# Patient Record
Sex: MALE | Race: WHITE | NOT HISPANIC OR LATINO | URBAN - NONMETROPOLITAN AREA
[De-identification: names, ages, dates, MRNs, and addresses within clinical notes are randomized per-mention and may not be internally consistent; named-entity substitution may affect disease eponyms.]

---

## 2021-08-25 ENCOUNTER — LAB (OUTPATIENT)
Dept: LAB | Facility: HOSPITAL | Age: 46
End: 2021-08-25

## 2021-08-25 ENCOUNTER — TRANSCRIBE ORDERS (OUTPATIENT)
Dept: ADMINISTRATIVE | Facility: HOSPITAL | Age: 46
End: 2021-08-25

## 2021-08-25 DIAGNOSIS — Z11.59 SPECIAL SCREENING EXAMINATION FOR UNSPECIFIED VIRAL DISEASE: ICD-10-CM

## 2021-08-25 DIAGNOSIS — Z11.59 SPECIAL SCREENING EXAMINATION FOR UNSPECIFIED VIRAL DISEASE: Primary | ICD-10-CM

## 2021-08-26 LAB — SARS-COV-2 RNA NOSE QL NAA+PROBE: NOT DETECTED

## 2021-08-26 PROCEDURE — U0004 COV-19 TEST NON-CDC HGH THRU: HCPCS | Performed by: INTERNAL MEDICINE

## 2025-05-04 ENCOUNTER — APPOINTMENT (OUTPATIENT)
Dept: GENERAL RADIOLOGY | Facility: HOSPITAL | Age: 50
End: 2025-05-04
Payer: COMMERCIAL

## 2025-05-04 ENCOUNTER — HOSPITAL ENCOUNTER (EMERGENCY)
Facility: HOSPITAL | Age: 50
Discharge: HOME | End: 2025-05-04
Attending: STUDENT IN AN ORGANIZED HEALTH CARE EDUCATION/TRAINING PROGRAM | Admitting: STUDENT IN AN ORGANIZED HEALTH CARE EDUCATION/TRAINING PROGRAM
Payer: COMMERCIAL

## 2025-05-04 ENCOUNTER — APPOINTMENT (OUTPATIENT)
Dept: CT IMAGING | Facility: HOSPITAL | Age: 50
End: 2025-05-04
Payer: COMMERCIAL

## 2025-05-04 VITALS
SYSTOLIC BLOOD PRESSURE: 151 MMHG | WEIGHT: 220 LBS | RESPIRATION RATE: 16 BRPM | HEIGHT: 70 IN | BODY MASS INDEX: 31.5 KG/M2 | TEMPERATURE: 97.5 F | DIASTOLIC BLOOD PRESSURE: 92 MMHG | HEART RATE: 80 BPM | OXYGEN SATURATION: 93 %

## 2025-05-04 DIAGNOSIS — R06.02 SHORTNESS OF BREATH: Primary | ICD-10-CM

## 2025-05-04 DIAGNOSIS — R07.9 CHEST PAIN, UNSPECIFIED TYPE: ICD-10-CM

## 2025-05-04 DIAGNOSIS — R60.0 PERIPHERAL EDEMA: ICD-10-CM

## 2025-05-04 LAB
ALBUMIN SERPL-MCNC: 4.2 G/DL (ref 3.5–5.2)
ALBUMIN/GLOB SERPL: 1.6 G/DL
ALP SERPL-CCNC: 66 U/L (ref 39–117)
ALT SERPL W P-5'-P-CCNC: 25 U/L (ref 1–41)
ANION GAP SERPL CALCULATED.3IONS-SCNC: 7.9 MMOL/L (ref 5–15)
APTT PPP: 29.1 SECONDS (ref 24.5–35.9)
AST SERPL-CCNC: 21 U/L (ref 1–40)
B PARAPERT DNA SPEC QL NAA+PROBE: NOT DETECTED
B PERT DNA SPEC QL NAA+PROBE: NOT DETECTED
BASOPHILS # BLD AUTO: 0.03 10*3/MM3 (ref 0–0.2)
BASOPHILS NFR BLD AUTO: 0.5 % (ref 0–1.5)
BILIRUB SERPL-MCNC: 0.5 MG/DL (ref 0–1.2)
BILIRUB UR QL STRIP: NEGATIVE
BUN SERPL-MCNC: 18 MG/DL (ref 6–20)
BUN/CREAT SERPL: 19.6 (ref 7–25)
C PNEUM DNA NPH QL NAA+NON-PROBE: NOT DETECTED
CALCIUM SPEC-SCNC: 9.3 MG/DL (ref 8.6–10.5)
CHLORIDE SERPL-SCNC: 107 MMOL/L (ref 98–107)
CLARITY UR: CLEAR
CO2 SERPL-SCNC: 30.1 MMOL/L (ref 22–29)
COLOR UR: YELLOW
CREAT SERPL-MCNC: 0.92 MG/DL (ref 0.76–1.27)
CRP SERPL-MCNC: <0.3 MG/DL (ref 0–0.5)
D DIMER PPP FEU-MCNC: 0.42 MCGFEU/ML (ref 0–0.51)
D-LACTATE SERPL-SCNC: 0.7 MMOL/L (ref 0.5–2)
DEPRECATED RDW RBC AUTO: 45.1 FL (ref 37–54)
EGFRCR SERPLBLD CKD-EPI 2021: 100.7 ML/MIN/1.73
EOSINOPHIL # BLD AUTO: 0.16 10*3/MM3 (ref 0–0.4)
EOSINOPHIL NFR BLD AUTO: 2.6 % (ref 0.3–6.2)
ERYTHROCYTE [DISTWIDTH] IN BLOOD BY AUTOMATED COUNT: 13.6 % (ref 12.3–15.4)
FLUAV SUBTYP SPEC NAA+PROBE: NOT DETECTED
FLUBV RNA ISLT QL NAA+PROBE: NOT DETECTED
GEN 5 1HR TROPONIN T REFLEX: 18 NG/L
GLOBULIN UR ELPH-MCNC: 2.6 GM/DL
GLUCOSE BLDC GLUCOMTR-MCNC: 126 MG/DL (ref 70–130)
GLUCOSE SERPL-MCNC: 156 MG/DL (ref 65–99)
GLUCOSE UR STRIP-MCNC: NEGATIVE MG/DL
HADV DNA SPEC NAA+PROBE: NOT DETECTED
HCOV 229E RNA SPEC QL NAA+PROBE: NOT DETECTED
HCOV HKU1 RNA SPEC QL NAA+PROBE: NOT DETECTED
HCOV NL63 RNA SPEC QL NAA+PROBE: NOT DETECTED
HCOV OC43 RNA SPEC QL NAA+PROBE: NOT DETECTED
HCT VFR BLD AUTO: 41.5 % (ref 37.5–51)
HGB BLD-MCNC: 13.8 G/DL (ref 13–17.7)
HGB UR QL STRIP.AUTO: NEGATIVE
HMPV RNA NPH QL NAA+NON-PROBE: NOT DETECTED
HOLD SPECIMEN: NORMAL
HOLD SPECIMEN: NORMAL
HPIV1 RNA ISLT QL NAA+PROBE: NOT DETECTED
HPIV2 RNA SPEC QL NAA+PROBE: NOT DETECTED
HPIV3 RNA NPH QL NAA+PROBE: NOT DETECTED
HPIV4 P GENE NPH QL NAA+PROBE: NOT DETECTED
IMM GRANULOCYTES # BLD AUTO: 0.04 10*3/MM3 (ref 0–0.05)
IMM GRANULOCYTES NFR BLD AUTO: 0.7 % (ref 0–0.5)
INR PPP: 0.96 (ref 0.9–1.1)
KETONES UR QL STRIP: NEGATIVE
LEUKOCYTE ESTERASE UR QL STRIP.AUTO: NEGATIVE
LIPASE SERPL-CCNC: 57 U/L (ref 13–60)
LYMPHOCYTES # BLD AUTO: 1.35 10*3/MM3 (ref 0.7–3.1)
LYMPHOCYTES NFR BLD AUTO: 22.2 % (ref 19.6–45.3)
M PNEUMO IGG SER IA-ACNC: NOT DETECTED
MAGNESIUM SERPL-MCNC: 1.8 MG/DL (ref 1.6–2.6)
MCH RBC QN AUTO: 29.7 PG (ref 26.6–33)
MCHC RBC AUTO-ENTMCNC: 33.3 G/DL (ref 31.5–35.7)
MCV RBC AUTO: 89.2 FL (ref 79–97)
MONOCYTES # BLD AUTO: 0.69 10*3/MM3 (ref 0.1–0.9)
MONOCYTES NFR BLD AUTO: 11.3 % (ref 5–12)
NEUTROPHILS NFR BLD AUTO: 3.82 10*3/MM3 (ref 1.7–7)
NEUTROPHILS NFR BLD AUTO: 62.7 % (ref 42.7–76)
NITRITE UR QL STRIP: NEGATIVE
NRBC BLD AUTO-RTO: 0 /100 WBC (ref 0–0.2)
NT-PROBNP SERPL-MCNC: 248.5 PG/ML (ref 0–900)
PH UR STRIP.AUTO: 6.5 [PH] (ref 5–8)
PLATELET # BLD AUTO: 208 10*3/MM3 (ref 140–450)
PMV BLD AUTO: 8.7 FL (ref 6–12)
POTASSIUM SERPL-SCNC: 4.1 MMOL/L (ref 3.5–5.2)
PROT SERPL-MCNC: 6.8 G/DL (ref 6–8.5)
PROT UR QL STRIP: NEGATIVE
PROTHROMBIN TIME: 12.9 SECONDS (ref 11.6–15.1)
QT INTERVAL: 368 MS
QTC INTERVAL: 434 MS
RBC # BLD AUTO: 4.65 10*6/MM3 (ref 4.14–5.8)
RHINOVIRUS RNA SPEC NAA+PROBE: NOT DETECTED
RSV RNA NPH QL NAA+NON-PROBE: NOT DETECTED
SARS-COV-2 RNA RESP QL NAA+PROBE: NOT DETECTED
SODIUM SERPL-SCNC: 145 MMOL/L (ref 136–145)
SP GR UR STRIP: 1.01 (ref 1–1.03)
T4 FREE SERPL-MCNC: 1.13 NG/DL (ref 0.92–1.68)
TROPONIN T NUMERIC DELTA: -1 NG/L
TROPONIN T SERPL HS-MCNC: 19 NG/L
TSH SERPL DL<=0.05 MIU/L-ACNC: 4.22 UIU/ML (ref 0.27–4.2)
UROBILINOGEN UR QL STRIP: NORMAL
WBC NRBC COR # BLD AUTO: 6.09 10*3/MM3 (ref 3.4–10.8)
WHOLE BLOOD HOLD COAG: NORMAL
WHOLE BLOOD HOLD SPECIMEN: NORMAL

## 2025-05-04 PROCEDURE — 71045 X-RAY EXAM CHEST 1 VIEW: CPT | Performed by: RADIOLOGY

## 2025-05-04 PROCEDURE — 85730 THROMBOPLASTIN TIME PARTIAL: CPT | Performed by: PHYSICIAN ASSISTANT

## 2025-05-04 PROCEDURE — 84439 ASSAY OF FREE THYROXINE: CPT | Performed by: PHYSICIAN ASSISTANT

## 2025-05-04 PROCEDURE — 70450 CT HEAD/BRAIN W/O DYE: CPT | Performed by: RADIOLOGY

## 2025-05-04 PROCEDURE — 71275 CT ANGIOGRAPHY CHEST: CPT

## 2025-05-04 PROCEDURE — 71275 CT ANGIOGRAPHY CHEST: CPT | Performed by: RADIOLOGY

## 2025-05-04 PROCEDURE — 36415 COLL VENOUS BLD VENIPUNCTURE: CPT

## 2025-05-04 PROCEDURE — 84484 ASSAY OF TROPONIN QUANT: CPT | Performed by: PHYSICIAN ASSISTANT

## 2025-05-04 PROCEDURE — 85379 FIBRIN DEGRADATION QUANT: CPT | Performed by: PHYSICIAN ASSISTANT

## 2025-05-04 PROCEDURE — 82948 REAGENT STRIP/BLOOD GLUCOSE: CPT

## 2025-05-04 PROCEDURE — 83690 ASSAY OF LIPASE: CPT | Performed by: PHYSICIAN ASSISTANT

## 2025-05-04 PROCEDURE — 72125 CT NECK SPINE W/O DYE: CPT | Performed by: RADIOLOGY

## 2025-05-04 PROCEDURE — 83880 ASSAY OF NATRIURETIC PEPTIDE: CPT | Performed by: PHYSICIAN ASSISTANT

## 2025-05-04 PROCEDURE — 81003 URINALYSIS AUTO W/O SCOPE: CPT | Performed by: PHYSICIAN ASSISTANT

## 2025-05-04 PROCEDURE — 85610 PROTHROMBIN TIME: CPT | Performed by: PHYSICIAN ASSISTANT

## 2025-05-04 PROCEDURE — 83605 ASSAY OF LACTIC ACID: CPT | Performed by: PHYSICIAN ASSISTANT

## 2025-05-04 PROCEDURE — 70450 CT HEAD/BRAIN W/O DYE: CPT

## 2025-05-04 PROCEDURE — 93010 ELECTROCARDIOGRAM REPORT: CPT | Performed by: INTERNAL MEDICINE

## 2025-05-04 PROCEDURE — 99285 EMERGENCY DEPT VISIT HI MDM: CPT

## 2025-05-04 PROCEDURE — 71045 X-RAY EXAM CHEST 1 VIEW: CPT

## 2025-05-04 PROCEDURE — 80050 GENERAL HEALTH PANEL: CPT | Performed by: PHYSICIAN ASSISTANT

## 2025-05-04 PROCEDURE — 86140 C-REACTIVE PROTEIN: CPT | Performed by: PHYSICIAN ASSISTANT

## 2025-05-04 PROCEDURE — 87040 BLOOD CULTURE FOR BACTERIA: CPT | Performed by: PHYSICIAN ASSISTANT

## 2025-05-04 PROCEDURE — 25510000001 IOPAMIDOL PER 1 ML: Performed by: STUDENT IN AN ORGANIZED HEALTH CARE EDUCATION/TRAINING PROGRAM

## 2025-05-04 PROCEDURE — 93005 ELECTROCARDIOGRAM TRACING: CPT | Performed by: PHYSICIAN ASSISTANT

## 2025-05-04 PROCEDURE — 72125 CT NECK SPINE W/O DYE: CPT

## 2025-05-04 PROCEDURE — 0202U NFCT DS 22 TRGT SARS-COV-2: CPT | Performed by: PHYSICIAN ASSISTANT

## 2025-05-04 PROCEDURE — 83735 ASSAY OF MAGNESIUM: CPT | Performed by: PHYSICIAN ASSISTANT

## 2025-05-04 RX ORDER — IOPAMIDOL 755 MG/ML
100 INJECTION, SOLUTION INTRAVASCULAR
Status: DISCONTINUED | OUTPATIENT
Start: 2025-05-04 | End: 2025-05-04

## 2025-05-04 RX ORDER — IOPAMIDOL 755 MG/ML
100 INJECTION, SOLUTION INTRAVASCULAR
Status: COMPLETED | OUTPATIENT
Start: 2025-05-04 | End: 2025-05-04

## 2025-05-04 RX ORDER — SODIUM CHLORIDE 0.9 % (FLUSH) 0.9 %
10 SYRINGE (ML) INJECTION AS NEEDED
Status: DISCONTINUED | OUTPATIENT
Start: 2025-05-04 | End: 2025-05-04 | Stop reason: HOSPADM

## 2025-05-04 RX ADMIN — IOPAMIDOL 60 ML: 755 INJECTION, SOLUTION INTRAVENOUS at 13:57

## 2025-05-04 NOTE — Clinical Note
[facility] EMERGENCY DEPARTMENT  [address] [facility] [address]  Phone: [phone]    Shavon Conrad accompanied Da Conrad to the emergency department on 5/4/2025. They may return to work on 05/05/2025.        Thank you for choosing [facility].    Gin Benites, PA

## 2025-05-04 NOTE — ED PROVIDER NOTES
Subjective   History of Present Illness  49-year-old male presents to the ED today for shortness of breath.  He states he woke up this morning and felt fine and then about an hour after he woke up he became short of breath with chest tightness.  He states this was probably around 9 AM.  He states he then developed a cough and coughed up pink, foamy sputum.  He denies any fever.  He denies any specific known sick contacts but he does work in a hospital setting.  He states he has had bilateral lower extremity edema for a long time but it has been worse this week and is described as pitting.  He states he has had left lung pain and it is worse when he eats.  States he is also having head and neck pain and his ears feel full, almost like they need to pop.  Has any history of heart disease or CHF.  He was diagnosed with sarcoidosis in 2018.  He has not currently on any medication for it.  He states he does take medication for hypertension and depression.  He is supposed to be on Mounjaro for his diabetes but has not had it in about a month.  He is on losartan and Cialis.    History provided by:  Patient  Shortness of Breath  Severity:  Moderate  Onset quality:  Sudden  Duration:  2 hours  Timing:  Constant  Progression:  Unchanged  Chronicity:  New  Relieved by:  Nothing  Worsened by:  Nothing  Associated symptoms: chest pain, cough, ear pain, headaches, hemoptysis (pink, foamy sputum), neck pain and sputum production    Associated symptoms: no abdominal pain, no diaphoresis, no fever, no rash, no sore throat, no syncope, no swollen glands, no vomiting and no wheezing    Risk factors: obesity        Review of Systems   Constitutional:  Negative for diaphoresis and fever.   HENT:  Positive for ear pain. Negative for sore throat.    Eyes: Negative.    Respiratory:  Positive for cough, hemoptysis (pink, foamy sputum), sputum production, chest tightness and shortness of breath. Negative for wheezing.    Cardiovascular:   Positive for chest pain, palpitations and leg swelling. Negative for syncope.   Gastrointestinal:  Negative for abdominal pain and vomiting.   Genitourinary: Negative.    Musculoskeletal:  Positive for neck pain.   Skin:  Negative for rash.   Neurological:  Positive for headaches.   Psychiatric/Behavioral: Negative.     All other systems reviewed and are negative.      No past medical history on file.    No Known Allergies    No past surgical history on file.    No family history on file.    Social History     Socioeconomic History    Marital status:            Objective   Physical Exam  Vitals and nursing note reviewed.   Constitutional:       General: He is not in acute distress.     Appearance: He is well-developed. He is obese. He is not diaphoretic.   HENT:      Head: Normocephalic and atraumatic.      Right Ear: Hearing, tympanic membrane, ear canal and external ear normal.      Left Ear: Hearing, tympanic membrane, ear canal and external ear normal.      Mouth/Throat:      Mouth: Mucous membranes are moist.      Pharynx: Oropharynx is clear.   Eyes:      Extraocular Movements: Extraocular movements intact.      Pupils: Pupils are equal, round, and reactive to light.   Cardiovascular:      Rate and Rhythm: Normal rate and regular rhythm.      Pulses: Normal pulses.      Heart sounds: Normal heart sounds.   Pulmonary:      Effort: Pulmonary effort is normal.      Breath sounds: Normal breath sounds.   Chest:      Chest wall: No tenderness.   Abdominal:      General: Bowel sounds are normal.      Palpations: Abdomen is soft.      Tenderness: There is no abdominal tenderness.   Musculoskeletal:         General: Normal range of motion.      Cervical back: Normal range of motion and neck supple.      Right lower leg: No edema.      Left lower leg: No edema.   Skin:     General: Skin is warm and dry.      Capillary Refill: Capillary refill takes less than 2 seconds.   Neurological:      General: No focal  deficit present.      Mental Status: He is alert and oriented to person, place, and time.   Psychiatric:         Mood and Affect: Mood normal.         Procedures       Results for orders placed or performed during the hospital encounter of 05/04/25   ECG 12 Lead Dyspnea    Collection Time: 05/04/25 10:08 AM   Result Value Ref Range    QT Interval 368 ms    QTC Interval 434 ms   POC Glucose Once    Collection Time: 05/04/25 11:26 AM    Specimen: Blood   Result Value Ref Range    Glucose 126 70 - 130 mg/dL   Respiratory Panel PCR w/COVID-19(SARS-CoV-2) RASHID/EVANGELINA/NIKKI/PAD/COR/ZEHRA In-House, NP Swab in UTM/VTM, 2 HR TAT - Swab, Nasopharynx    Collection Time: 05/04/25 11:36 AM    Specimen: Nasopharynx; Swab   Result Value Ref Range    ADENOVIRUS, PCR Not Detected Not Detected    Coronavirus 229E Not Detected Not Detected    Coronavirus HKU1 Not Detected Not Detected    Coronavirus NL63 Not Detected Not Detected    Coronavirus OC43 Not Detected Not Detected    COVID19 Not Detected Not Detected - Ref. Range    Human Metapneumovirus Not Detected Not Detected    Human Rhinovirus/Enterovirus Not Detected Not Detected    Influenza A PCR Not Detected Not Detected    Influenza B PCR Not Detected Not Detected    Parainfluenza Virus 1 Not Detected Not Detected    Parainfluenza Virus 2 Not Detected Not Detected    Parainfluenza Virus 3 Not Detected Not Detected    Parainfluenza Virus 4 Not Detected Not Detected    RSV, PCR Not Detected Not Detected    Bordetella pertussis pcr Not Detected Not Detected    Bordetella parapertussis PCR Not Detected Not Detected    Chlamydophila pneumoniae PCR Not Detected Not Detected    Mycoplasma pneumo by PCR Not Detected Not Detected   Comprehensive Metabolic Panel    Collection Time: 05/04/25 11:36 AM    Specimen: Arm, Right; Blood   Result Value Ref Range    Glucose 156 (H) 65 - 99 mg/dL    BUN 18 6 - 20 mg/dL    Creatinine 0.92 0.76 - 1.27 mg/dL    Sodium 145 136 - 145 mmol/L    Potassium 4.1 3.5  - 5.2 mmol/L    Chloride 107 98 - 107 mmol/L    CO2 30.1 (H) 22.0 - 29.0 mmol/L    Calcium 9.3 8.6 - 10.5 mg/dL    Total Protein 6.8 6.0 - 8.5 g/dL    Albumin 4.2 3.5 - 5.2 g/dL    ALT (SGPT) 25 1 - 41 U/L    AST (SGOT) 21 1 - 40 U/L    Alkaline Phosphatase 66 39 - 117 U/L    Total Bilirubin 0.5 0.0 - 1.2 mg/dL    Globulin 2.6 gm/dL    A/G Ratio 1.6 g/dL    BUN/Creatinine Ratio 19.6 7.0 - 25.0    Anion Gap 7.9 5.0 - 15.0 mmol/L    eGFR 100.7 >60.0 mL/min/1.73   Protime-INR    Collection Time: 05/04/25 11:36 AM    Specimen: Arm, Right; Blood   Result Value Ref Range    Protime 12.9 11.6 - 15.1 Seconds    INR 0.96 0.90 - 1.10   aPTT    Collection Time: 05/04/25 11:36 AM    Specimen: Arm, Right; Blood   Result Value Ref Range    PTT 29.1 24.5 - 35.9 seconds   Lipase    Collection Time: 05/04/25 11:36 AM    Specimen: Arm, Right; Blood   Result Value Ref Range    Lipase 57 13 - 60 U/L   BNP    Collection Time: 05/04/25 11:36 AM    Specimen: Arm, Right; Blood   Result Value Ref Range    proBNP 248.5 0.0 - 900.0 pg/mL   High Sensitivity Troponin T    Collection Time: 05/04/25 11:36 AM    Specimen: Arm, Right; Blood   Result Value Ref Range    HS Troponin T 19 <22 ng/L   Lactic Acid, Plasma    Collection Time: 05/04/25 11:36 AM    Specimen: Arm, Right; Blood   Result Value Ref Range    Lactate 0.7 0.5 - 2.0 mmol/L   C-reactive Protein    Collection Time: 05/04/25 11:36 AM    Specimen: Arm, Right; Blood   Result Value Ref Range    C-Reactive Protein <0.30 0.00 - 0.50 mg/dL   Magnesium    Collection Time: 05/04/25 11:36 AM    Specimen: Arm, Right; Blood   Result Value Ref Range    Magnesium 1.8 1.6 - 2.6 mg/dL   TSH    Collection Time: 05/04/25 11:36 AM    Specimen: Arm, Right; Blood   Result Value Ref Range    TSH 4.220 (H) 0.270 - 4.200 uIU/mL   T4, Free    Collection Time: 05/04/25 11:36 AM    Specimen: Arm, Right; Blood   Result Value Ref Range    Free T4 1.13 0.92 - 1.68 ng/dL   CBC Auto Differential    Collection Time:  05/04/25 11:36 AM    Specimen: Arm, Right; Blood   Result Value Ref Range    WBC 6.09 3.40 - 10.80 10*3/mm3    RBC 4.65 4.14 - 5.80 10*6/mm3    Hemoglobin 13.8 13.0 - 17.7 g/dL    Hematocrit 41.5 37.5 - 51.0 %    MCV 89.2 79.0 - 97.0 fL    MCH 29.7 26.6 - 33.0 pg    MCHC 33.3 31.5 - 35.7 g/dL    RDW 13.6 12.3 - 15.4 %    RDW-SD 45.1 37.0 - 54.0 fl    MPV 8.7 6.0 - 12.0 fL    Platelets 208 140 - 450 10*3/mm3    Neutrophil % 62.7 42.7 - 76.0 %    Lymphocyte % 22.2 19.6 - 45.3 %    Monocyte % 11.3 5.0 - 12.0 %    Eosinophil % 2.6 0.3 - 6.2 %    Basophil % 0.5 0.0 - 1.5 %    Immature Grans % 0.7 (H) 0.0 - 0.5 %    Neutrophils, Absolute 3.82 1.70 - 7.00 10*3/mm3    Lymphocytes, Absolute 1.35 0.70 - 3.10 10*3/mm3    Monocytes, Absolute 0.69 0.10 - 0.90 10*3/mm3    Eosinophils, Absolute 0.16 0.00 - 0.40 10*3/mm3    Basophils, Absolute 0.03 0.00 - 0.20 10*3/mm3    Immature Grans, Absolute 0.04 0.00 - 0.05 10*3/mm3    nRBC 0.0 0.0 - 0.2 /100 WBC   D-dimer, Quantitative    Collection Time: 05/04/25 11:36 AM    Specimen: Arm, Right; Blood   Result Value Ref Range    D-Dimer, Quantitative 0.42 0.00 - 0.51 MCGFEU/mL   Green Top (Gel)    Collection Time: 05/04/25 11:36 AM   Result Value Ref Range    Extra Tube Hold for add-ons.    Lavender Top    Collection Time: 05/04/25 11:36 AM   Result Value Ref Range    Extra Tube hold for add-on    Gold Top - SST    Collection Time: 05/04/25 11:36 AM   Result Value Ref Range    Extra Tube Hold for add-ons.    Light Blue Top    Collection Time: 05/04/25 11:36 AM   Result Value Ref Range    Extra Tube Hold for add-ons.    Urinalysis With Microscopic If Indicated (No Culture) - Urine, Clean Catch    Collection Time: 05/04/25 11:37 AM    Specimen: Urine, Clean Catch   Result Value Ref Range    Color, UA Yellow Yellow, Straw    Appearance, UA Clear Clear    pH, UA 6.5 5.0 - 8.0    Specific Gravity, UA 1.007 1.005 - 1.030    Glucose, UA Negative Negative    Ketones, UA Negative Negative     Bilirubin, UA Negative Negative    Blood, UA Negative Negative    Protein, UA Negative Negative    Leuk Esterase, UA Negative Negative    Nitrite, UA Negative Negative    Urobilinogen, UA 0.2 E.U./dL 0.2 - 1.0 E.U./dL   High Sensitivity Troponin T 1Hr    Collection Time: 05/04/25 12:48 PM    Specimen: Arm, Right; Blood   Result Value Ref Range    HS Troponin T 18 <22 ng/L    Troponin T Numeric Delta -1 Abnormal if >/=3 ng/L          ED Course  ED Course as of 05/04/25 1516   Sun May 04, 2025   1306 XR Chest 1 View  No pneumonia or acute process seen in the chest.  Normal heart size and mediastinal contours.  Trachea is in midline position.  No edema or effusion is seen.  There is no evidence of pneumothorax.     IMPRESSION:     No evidence of acute disease in the chest.   [AH]   1448 CT Angiogram Chest Pulmonary Embolism  No evidence of thoracic aortic aneurysm or dissection.  No focal infiltrate.  No lymphadenopathy.  No pleural effusion.  No pneumothorax.  No fracture.     IMPRESSION:  Impression:     No acute process in the chest.   [AH]   1449 CT Cervical Spine Without Contrast  No malalignment or acute cervical spine fracture.  No facet dislocation.  No prevertebral soft tissue swelling.  No soft tissue gas or radio-opaque foreign bodies.     C4-C5 foraminal narrowing     IMPRESSION:     No acute fracture or malalignment.   [AH]   1449 CT Head Without Contrast    BRAIN:  Age appropriate atrophy and white matter disease..  No  hemorrhage.  No significant white matter disease.  No edema.       VENTRICLES:  Unremarkable.  No ventriculomegaly.       BONES/JOINTS:  Unremarkable.  No acute fracture.       SOFT TISSUES:  Unremarkable.       SINUSES:  no air fluid levels       MASTOID AIR CELLS:  Unremarkable as visualized.  No mastoid effusion.        IMPRESSION:    No acute intracranial abnormality.    []      ED Course User Index  [AH] Gin Benites, PA                  HEART Score: 4                                       Medical Decision Making  49-year-old male presents to the ED today due to an episode of shortness of breath with chest pain.  He also had palpitations and peripheral edema.  EKG showed no acute ischemia.  Serial troponins were negative.  proBNP and D-dimer were negative.  CT head, neck and chest showed no acute abnormalities.  I discussed the findings with the patient and his wife.  We discussed admission for continued cardiac workup versus outpatient workup.  At this time the patient prefers outpatient workup.  He will be scheduled for an outpatient stress test.  I advised him to call his family doctor tomorrow to be scheduled for an outpatient echocardiogram.  He was advised to follow-up with cardiology after having his testing.  He was advised to return to the ED at any time if symptoms change or worsen.    Problems Addressed:  Chest pain, unspecified type: complicated acute illness or injury  Peripheral edema: complicated acute illness or injury  Shortness of breath: complicated acute illness or injury    Amount and/or Complexity of Data Reviewed  Labs: ordered.  Radiology: ordered. Decision-making details documented in ED Course.  ECG/medicine tests: ordered.    Risk  Prescription drug management.        Final diagnoses:   Shortness of breath   Chest pain, unspecified type   Peripheral edema       ED Disposition  ED Disposition       ED Disposition   Discharge    Condition   Stable    Comment   --               Tulio Fernandez, APRN  [address]  [facility] [address]  [phone]    Call in 1 day      Nupur Duenas MD  [address] [place] [address]  [address]  [facility] [address]  [phone]    Schedule an appointment as soon as possible for a visit in 1 week           Medication List      No changes were made to your prescriptions during this visit.            Gin Benites, PA  05/04/25 3751

## 2025-05-04 NOTE — DISCHARGE INSTRUCTIONS
You are being scheduled for an outpatient stress test.  Scheduling will call you with the date and time of your test.  Please speak with your family doctor tomorrow about scheduling you for an echocardiogram.  Once you have these tests complete you can follow-up with cardiology in the office.  Return to the ED at any time if symptoms change or worsen.

## 2025-05-05 ENCOUNTER — TRANSCRIBE ORDERS (OUTPATIENT)
Dept: ADMINISTRATIVE | Facility: HOSPITAL | Age: 50
End: 2025-05-05
Payer: COMMERCIAL

## 2025-05-05 DIAGNOSIS — R07.9 CHEST PAIN, UNSPECIFIED TYPE: Primary | ICD-10-CM

## 2025-05-05 DIAGNOSIS — R06.02 SHORTNESS OF BREATH: ICD-10-CM

## 2025-05-08 ENCOUNTER — TRANSCRIBE ORDERS (OUTPATIENT)
Dept: ADMINISTRATIVE | Facility: HOSPITAL | Age: 50
End: 2025-05-08
Payer: COMMERCIAL

## 2025-05-08 DIAGNOSIS — R07.9 CHEST PAIN, UNSPECIFIED TYPE: Primary | ICD-10-CM

## 2025-05-09 LAB
BACTERIA SPEC AEROBE CULT: NORMAL
BACTERIA SPEC AEROBE CULT: NORMAL

## 2025-05-14 ENCOUNTER — TRANSCRIBE ORDERS (OUTPATIENT)
Dept: LAB | Facility: HOSPITAL | Age: 50
End: 2025-05-14
Payer: COMMERCIAL

## 2025-05-14 ENCOUNTER — LAB (OUTPATIENT)
Dept: LAB | Facility: HOSPITAL | Age: 50
End: 2025-05-14
Payer: COMMERCIAL

## 2025-05-14 DIAGNOSIS — E11.9 TYPE 2 DIABETES MELLITUS WITHOUT COMPLICATION, UNSPECIFIED WHETHER LONG TERM INSULIN USE: Primary | ICD-10-CM

## 2025-05-14 DIAGNOSIS — E11.9 TYPE 2 DIABETES MELLITUS WITHOUT COMPLICATION, UNSPECIFIED WHETHER LONG TERM INSULIN USE: ICD-10-CM

## 2025-05-14 LAB
CHOLEST SERPL-MCNC: 149 MG/DL (ref 0–200)
HBA1C MFR BLD: 6.1 % (ref 4.8–5.6)
HDLC SERPL-MCNC: 49 MG/DL (ref 40–60)
LDLC SERPL CALC-MCNC: 87 MG/DL (ref 0–100)
LDLC/HDLC SERPL: 1.77 {RATIO}
TRIGL SERPL-MCNC: 67 MG/DL (ref 0–150)
VLDLC SERPL-MCNC: 13 MG/DL (ref 5–40)

## 2025-05-14 PROCEDURE — 83036 HEMOGLOBIN GLYCOSYLATED A1C: CPT

## 2025-05-14 PROCEDURE — 36415 COLL VENOUS BLD VENIPUNCTURE: CPT

## 2025-05-14 PROCEDURE — 80061 LIPID PANEL: CPT

## 2025-05-20 ENCOUNTER — HOSPITAL ENCOUNTER (OUTPATIENT)
Dept: CARDIOLOGY | Facility: HOSPITAL | Age: 50
Discharge: HOME | End: 2025-05-20
Payer: COMMERCIAL

## 2025-05-20 VITALS — WEIGHT: 220.02 LBS | HEIGHT: 70 IN | BODY MASS INDEX: 31.5 KG/M2

## 2025-05-20 VITALS
DIASTOLIC BLOOD PRESSURE: 80 MMHG | SYSTOLIC BLOOD PRESSURE: 162 MMHG | WEIGHT: 220.02 LBS | BODY MASS INDEX: 31.5 KG/M2 | HEIGHT: 70 IN

## 2025-05-20 DIAGNOSIS — R07.9 CHEST PAIN, UNSPECIFIED TYPE: ICD-10-CM

## 2025-05-20 DIAGNOSIS — R06.02 SHORTNESS OF BREATH: ICD-10-CM

## 2025-05-20 LAB
AORTIC DIMENSIONLESS INDEX: 0.9 (DI)
ASCENDING AORTA: 3.3 CM
AV MEAN PRESS GRAD SYS DOP V1V2: 4.7 MMHG
AV VMAX SYS DOP: 148.6 CM/SEC
BH CV ECHO MEAS - 2D AUTO EF SIEMENS: 61.8 %
BH CV ECHO MEAS - AO MAX PG: 8.8 MMHG
BH CV ECHO MEAS - AO ROOT DIAM: 3 CM
BH CV ECHO MEAS - AO V2 VTI: 29.7 CM
BH CV ECHO MEAS - AVA(I,D): 2.8 CM2
BH CV ECHO MEAS - IVS/LVPW: 1 CM
BH CV ECHO MEAS - IVSD: 1.2 CM
BH CV ECHO MEAS - LA DIMENSION: 4.8 CM
BH CV ECHO MEAS - LAT PEAK E' VEL: 12.5 CM/SEC
BH CV ECHO MEAS - LV MAX PG: 5.7 MMHG
BH CV ECHO MEAS - LV MEAN PG: 2.7 MMHG
BH CV ECHO MEAS - LV V1 MAX: 119.4 CM/SEC
BH CV ECHO MEAS - LV V1 VTI: 26.8 CM
BH CV ECHO MEAS - LVIDD: 4.3 CM
BH CV ECHO MEAS - LVIDS: 3 CM
BH CV ECHO MEAS - LVOT AREA: 3.1 CM2
BH CV ECHO MEAS - LVOT DIAM: 2 CM
BH CV ECHO MEAS - LVPWD: 1.2 CM
BH CV ECHO MEAS - MED PEAK E' VEL: 8.3 CM/SEC
BH CV ECHO MEAS - MV A MAX VEL: 74.2 CM/SEC
BH CV ECHO MEAS - MV DEC SLOPE: 905.4 CM/SEC2
BH CV ECHO MEAS - MV E MAX VEL: 123 CM/SEC
BH CV ECHO MEAS - MV E/A: 1.66
BH CV ECHO MEAS - MV MAX PG: 5.7 MMHG
BH CV ECHO MEAS - MV MEAN PG: 2.9 MMHG
BH CV ECHO MEAS - MV P1/2T: 39 MSEC
BH CV ECHO MEAS - MV V2 VTI: 24.2 CM
BH CV ECHO MEAS - MVA(P1/2T): 5.6 CM2
BH CV ECHO MEAS - MVA(VTI): 3.5 CM2
BH CV ECHO MEAS - PA ACC TIME: 0.08 SEC
BH CV ECHO MEAS - RAP SYSTOLE: 8 MMHG
BH CV ECHO MEAS - SV(LVOT): 84.2 ML
BH CV ECHO MEAS - SVI(LVOT): 38.7 ML/M2
BH CV ECHO MEAS - TAPSE (>1.6): 2.6 CM
BH CV ECHO MEASUREMENTS AVERAGE E/E' RATIO: 11.83
BH CV XLRA - RV BASE: 4.5 CM
BH CV XLRA - RV LENGTH: 8.1 CM
BH CV XLRA - RV MID: 2.8 CM
BH CV XLRA - TDI S': 15.2 CM/SEC
IVRT: 52 MS
LEFT ATRIUM VOLUME INDEX: 40.2 ML/M2
LV EF 2D ECHO EST: 65 %

## 2025-05-20 PROCEDURE — 93306 TTE W/DOPPLER COMPLETE: CPT

## 2025-05-20 PROCEDURE — 93017 CV STRESS TEST TRACING ONLY: CPT

## 2025-05-20 RX ORDER — LOSARTAN POTASSIUM 100 MG/1
100 TABLET ORAL DAILY
COMMUNITY

## 2025-05-20 RX ORDER — ATORVASTATIN CALCIUM 20 MG/1
1 TABLET, FILM COATED ORAL DAILY
COMMUNITY

## 2025-05-20 RX ORDER — TADALAFIL 10 MG
10 TABLET ORAL DAILY
COMMUNITY
Start: 2025-05-07

## 2025-05-20 RX ORDER — GABAPENTIN 300 MG/1
300 CAPSULE ORAL DAILY PRN
COMMUNITY
Start: 2025-05-07

## 2025-05-20 RX ORDER — OMEPRAZOLE 20 MG/1
1 CAPSULE, DELAYED RELEASE ORAL DAILY
COMMUNITY
Start: 2025-05-07

## 2025-05-20 RX ORDER — CITALOPRAM HYDROBROMIDE 20 MG/1
20 TABLET ORAL DAILY
COMMUNITY
Start: 2025-03-03

## 2025-05-21 LAB
BH CV STRESS BP STAGE 1: NORMAL
BH CV STRESS BP STAGE 2: NORMAL
BH CV STRESS DURATION MIN STAGE 1: 3
BH CV STRESS DURATION MIN STAGE 2: 3
BH CV STRESS DURATION MIN STAGE 3: 0
BH CV STRESS DURATION SEC STAGE 1: 0
BH CV STRESS DURATION SEC STAGE 2: 0
BH CV STRESS DURATION SEC STAGE 3: 39
BH CV STRESS GRADE STAGE 1: 10
BH CV STRESS GRADE STAGE 2: 12
BH CV STRESS GRADE STAGE 3: 14
BH CV STRESS HR STAGE 1: 103
BH CV STRESS HR STAGE 3: 148
BH CV STRESS METS STAGE 1: 5
BH CV STRESS METS STAGE 2: 7.5
BH CV STRESS METS STAGE 3: 10
BH CV STRESS O2 STAGE 1: 100
BH CV STRESS O2 STAGE 2: 98
BH CV STRESS O2 STAGE 3: 93
BH CV STRESS PROTOCOL 1: NORMAL
BH CV STRESS RECOVERY BP: NORMAL MMHG
BH CV STRESS RECOVERY HR: 97 BPM
BH CV STRESS RECOVERY O2: 98 %
BH CV STRESS SPEED STAGE 1: 1.7
BH CV STRESS SPEED STAGE 2: 2.5
BH CV STRESS SPEED STAGE 3: 3.4
BH CV STRESS STAGE 1: 1
BH CV STRESS STAGE 2: 2
BH CV STRESS STAGE 3: 3
MAXIMAL PREDICTED HEART RATE: 168 BPM
PERCENT MAX PREDICTED HR: 89.29 %
STRESS BASELINE BP: NORMAL MMHG
STRESS BASELINE HR: 79 BPM
STRESS O2 SAT REST: 99 %
STRESS PERCENT HR: 105 %
STRESS POST ESTIMATED WORKLOAD: 8 METS
STRESS POST EXERCISE DUR MIN: 6 MIN
STRESS POST EXERCISE DUR SEC: 39 SEC
STRESS POST O2 SAT PEAK: 100 %
STRESS POST PEAK BP: NORMAL MMHG
STRESS POST PEAK HR: 150 BPM
STRESS TARGET HR: 143 BPM

## 2025-07-08 ENCOUNTER — OFFICE VISIT (OUTPATIENT)
Dept: PULMONOLOGY | Facility: CLINIC | Age: 50
End: 2025-07-08
Payer: COMMERCIAL

## 2025-07-08 VITALS
DIASTOLIC BLOOD PRESSURE: 82 MMHG | TEMPERATURE: 97.6 F | SYSTOLIC BLOOD PRESSURE: 138 MMHG | HEART RATE: 72 BPM | HEIGHT: 70 IN | OXYGEN SATURATION: 97 % | BODY MASS INDEX: 32.93 KG/M2 | WEIGHT: 230 LBS

## 2025-07-08 DIAGNOSIS — G47.33 OSA (OBSTRUCTIVE SLEEP APNEA): ICD-10-CM

## 2025-07-08 DIAGNOSIS — R05.3 CHRONIC COUGH: Primary | ICD-10-CM

## 2025-07-08 DIAGNOSIS — D86.1 SARCOIDOSIS OF LYMPH NODES: ICD-10-CM

## 2025-07-08 PROCEDURE — 99203 OFFICE O/P NEW LOW 30 MIN: CPT | Performed by: INTERNAL MEDICINE

## 2025-07-08 RX ORDER — ALBUTEROL SULFATE 90 UG/1
2 INHALANT RESPIRATORY (INHALATION) EVERY 4 HOURS PRN
Qty: 1 G | Refills: 5 | Status: SHIPPED | OUTPATIENT
Start: 2025-07-08

## 2025-07-08 RX ORDER — DIAZEPAM 5 MG/1
TABLET ORAL
COMMUNITY

## 2025-07-08 NOTE — PROGRESS NOTES
Chief Complaint  Sarcoidosis    Da Conrad is a 50 y.o. male who presents today to [facility] for Sarcoidosis.    HPI:   Patient is a very pleasant 50-year-old male with past medical history significant for obstructive sleep apnea, diabetes, morbid obesity.  He was also treated for sarcoidosis almost a decade ago when he was found to have mediastinal lymphadenopathy.  Mediastinoscopy with biopsy was done which apparently was inconclusive.  He was treated with systemic steroid for more than an year.  Patient lost weight after he was put on SGLT2 inhibitor.  He quit using CPAP.    He also reported eye skin discoloration around right medial malleolar area.  Which has been there for more than 10 years.    He reported an episode of cough, congestion back in early May.  Also reported chest discomfort.  He had a CT angiogram which did not show PE.  No mediastinal lymphadenopathy noted.    He reported persistent morning headache.  Unrefreshing sleep and increased fatigability.    He denies any other rash, joint stiffness.        Previous History:   History reviewed. No pertinent past medical history.   History reviewed. No pertinent surgical history.   Social History     Socioeconomic History    Marital status:    Tobacco Use    Smoking status: Never    Smokeless tobacco: Never   Vaping Use    Vaping status: Never Used   Substance and Sexual Activity    Alcohol use: Not Currently    Drug use: Never    Sexual activity: Defer        Current Medications:  Current Outpatient Medications   Medication Sig Dispense Refill    atorvastatin (LIPITOR) 20 MG tablet Take 1 tablet by mouth Daily.      Cialis 10 MG tablet Take 1 tablet by mouth Daily.      citalopram (CeleXA) 20 MG tablet Take 1 tablet by mouth Daily.      gabapentin (NEURONTIN) 300 MG capsule 1 capsule Daily As Needed.      losartan (COZAAR) 100 MG tablet Take 1 tablet by mouth Daily.      omeprazole  "(priLOSEC) 20 MG capsule Take 1 capsule by mouth Daily.      Tirzepatide (Mounjaro) 5 MG/0.5ML solution auto-injector Inject 5 mg as directed 1 (One) Time Per Week.      albuterol sulfate HFA (Ventolin HFA) 108 (90 Base) MCG/ACT inhaler Inhale 2 puffs Every 4 (Four) Hours As Needed for Wheezing. 1 g 5    diazePAM (VALIUM) 5 MG tablet TAKE 1-2 TABLETS BY MOUTH ONE HOUR BEFORE PROCEDURE (Patient not taking: Reported on 7/8/2025)       No current facility-administered medications for this visit.       Allergies:   No Known Allergies    Vitals:   /82   Pulse 72   Temp 97.6 °F (36.4 °C)   Ht 177.8 cm (70\")   Wt 104 kg (230 lb)   SpO2 97%   BMI 33.00 kg/m²     Estimated body mass index is 33 kg/m² as calculated from the following:    Height as of this encounter: 177.8 cm (70\").    Weight as of this encounter: 104 kg (230 lb).    Da Conrad  reports that he has never smoked. He has never used smokeless tobacco. I have educated him on the risk of diseases from using tobacco products such as .          Physical Exam:   Physical Exam  Vitals reviewed.   Constitutional:       Appearance: Normal appearance. He is obese.      Interventions: He is not intubated.  HENT:      Head: Normocephalic.      Nose: Nose normal.      Mouth/Throat:      Mouth: Mucous membranes are moist.      Comments: Moderate oropharyngeal crowding  Eyes:      Extraocular Movements: Extraocular movements intact.      Conjunctiva/sclera: Conjunctivae normal.      Pupils: Pupils are equal, round, and reactive to light.   Cardiovascular:      Rate and Rhythm: Normal rate.      Heart sounds: No murmur heard.     No friction rub. No gallop.   Pulmonary:      Effort: Pulmonary effort is normal. No tachypnea, bradypnea, accessory muscle usage, prolonged expiration, respiratory distress or retractions. He is not intubated.      Breath sounds: Normal breath sounds. No stridor, decreased air movement or transmitted upper airway sounds. No " wheezing or rales.   Abdominal:      General: There is no distension.      Palpations: Abdomen is soft. There is no mass.      Tenderness: There is no abdominal tenderness. There is no right CVA tenderness, left CVA tenderness, guarding or rebound.      Hernia: No hernia is present.   Skin:     Coloration: Skin is not jaundiced.      Findings: No erythema.   Neurological:      General: No focal deficit present.      Mental Status: He is alert and oriented to person, place, and time.   Psychiatric:         Mood and Affect: Mood normal.          Procedures     STOP-Bang Score:     [place] Sleepiness Scale: [place] Sleepiness Scale  Sitting and reading: High chance of dozing  Watching TV: High chance of dozing  Sitting, inactive in a public place (e.g. a theatre or a meeting): Moderate chance of dozing  As a passenger in a car for an hour without a break: Would never doze  Lying down to rest in the afternoon when circumstances permit: High chance of dozing  Sitting and talking to someone: Would never doze  Sitting quietly after a lunch without alcohol: Moderate chance of dozing  In a car, while stopped for a few minutes in traffic: Would never doze  Total score: 13     Lab Results:   Hospital Outpatient Visit on 05/20/2025   Component Date Value Ref Range Status    2D AUTO EF 05/20/2025 61.8  % Final    LVIDd 05/20/2025 4.3  cm Final    LVIDs 05/20/2025 3.0  cm Final    IVSd 05/20/2025 1.20  cm Final    LVPWd 05/20/2025 1.20  cm Final    IVS/LVPW 05/20/2025 1.00  cm Final    LVOT area 05/20/2025 3.1  cm2 Final    LVOT diam 05/20/2025 2.00  cm Final    SVi (LVOT) 05/20/2025 38.7  ml/m2 Final    MV E max roldan 05/20/2025 123.0  cm/sec Final    MV A max roldan 05/20/2025 74.2  cm/sec Final    MV E/A 05/20/2025 1.66   Final    IVRT 05/20/2025 52.0  ms Final    LA ESV Index (BP) 05/20/2025 40.2  ml/m2 Final    Med Peak E' Roldan 05/20/2025 8.3  cm/sec Final    Lat Peak E' Roldan 05/20/2025 12.5  cm/sec Final    Avg E/e' ratio  05/20/2025 11.83   Final    SV(LVOT) 05/20/2025 84.2  ml Final    RV Base 05/20/2025 4.5  cm Final    RV Mid 05/20/2025 2.8  cm Final    RV Length 05/20/2025 8.1  cm Final    TAPSE (>1.6) 05/20/2025 2.6  cm Final    RV S' 05/20/2025 15.2  cm/sec Final    LA dimension (2D)  05/20/2025 4.8  cm Final    LV V1 max 05/20/2025 119.4  cm/sec Final    LV V1 max PG 05/20/2025 5.7  mmHg Final    LV V1 mean PG 05/20/2025 2.7  mmHg Final    LV V1 VTI 05/20/2025 26.8  cm Final    Ao pk sofi 05/20/2025 148.6  cm/sec Final    Ao max PG 05/20/2025 8.8  mmHg Final    Ao mean PG 05/20/2025 4.7  mmHg Final    Ao V2 VTI 05/20/2025 29.7  cm Final    MIGUELINA(I,D) 05/20/2025 2.8  cm2 Final    Dimensionless Index 05/20/2025 0.90  (DI) Final    MV max PG 05/20/2025 5.7  mmHg Final    MV mean PG 05/20/2025 2.9  mmHg Final    MV V2 VTI 05/20/2025 24.2  cm Final    MV P1/2t 05/20/2025 39.0  msec Final    MVA(P1/2t) 05/20/2025 5.6  cm2 Final    MVA(VTI) 05/20/2025 3.5  cm2 Final    MV dec slope 05/20/2025 905.4  cm/sec2 Final    PA acc time 05/20/2025 0.08  sec Final    Ao root diam 05/20/2025 3.0  cm Final    Ascending aorta 05/20/2025 3.3  cm Final    RAP systole 05/20/2025 8  mmHg Final    Echo EF Estimated 05/20/2025 65.0  % Final   Hospital Outpatient Visit on 05/20/2025   Component Date Value Ref Range Status    Target HR (85%) 05/20/2025 143  bpm Final    Max. Pred. HR (100%) 05/20/2025 168  bpm Final     CV STRESS PROTOCOL 1 05/20/2025 Amauri   Final    Stage 1 05/20/2025 1.0   Final    HR Stage 1 05/20/2025 103   Final    BP Stage 1 05/20/2025 134/88   Final    O2 Stage 1 05/20/2025 100   Final    Duration Min Stage 1 05/20/2025 3   Final    Duration Sec Stage 1 05/20/2025 0   Final    Grade Stage 1 05/20/2025 10   Final    Speed Stage 1 05/20/2025 1.7   Final    BH CV STRESS METS STAGE 1 05/20/2025 5.0   Final    Stage 2 05/20/2025 2.0   Final    BP Stage 2 05/20/2025 160/84   Final    O2 Stage 2 05/20/2025 98   Final    Duration Min  Stage 2 05/20/2025 3   Final    Duration Sec Stage 2 05/20/2025 0   Final    Grade Stage 2 05/20/2025 12   Final    Speed Stage 2 05/20/2025 2.5   Final    BH CV STRESS METS STAGE 2 05/20/2025 7.5   Final    Stage 3 05/20/2025 3.0   Final    HR Stage 3 05/20/2025 148   Final    O2 Stage 3 05/20/2025 93   Final    Duration Min Stage 3 05/20/2025 0   Final    Duration Sec Stage 3 05/20/2025 39   Final    Grade Stage 3 05/20/2025 14   Final    Speed Stage 3 05/20/2025 3.4   Final    BH CV STRESS METS STAGE 3 05/20/2025 10.0   Final    Baseline HR 05/20/2025 79  bpm Final    Baseline BP 05/20/2025 108/74  mmHg Final    O2 sat rest 05/20/2025 99  % Final    Peak HR 05/20/2025 150  bpm Final    Peak BP 05/20/2025 160/84  mmHg Final    O2 sat peak 05/20/2025 100  % Final    Recovery BP 05/20/2025 124/82  mmHg Final    Percent Max Pred HR 05/20/2025 89.29  % Final    Percent Target HR 05/20/2025 105  % Final    Recovery HR 05/20/2025 97  bpm Final    Recovery O2 05/20/2025 98  % Final    Exercise duration (min) 05/20/2025 6  min Final    Exercise duration (sec) 05/20/2025 39  sec Final    Estimated workload 05/20/2025 8.0  METS Final   Lab on 05/14/2025   Component Date Value Ref Range Status    Total Cholesterol 05/14/2025 149  0 - 200 mg/dL Final    Triglycerides 05/14/2025 67  0 - 150 mg/dL Final    HDL Cholesterol 05/14/2025 49  40 - 60 mg/dL Final    LDL Cholesterol  05/14/2025 87  0 - 100 mg/dL Final    VLDL Cholesterol 05/14/2025 13  5 - 40 mg/dL Final    LDL/HDL Ratio 05/14/2025 1.77   Final    Hemoglobin A1C 05/14/2025 6.10 (H)  4.80 - 5.60 % Final   Admission on 05/04/2025, Discharged on 05/04/2025   Component Date Value Ref Range Status    ADENOVIRUS, PCR 05/04/2025 Not Detected  Not Detected Final    Coronavirus 229E 05/04/2025 Not Detected  Not Detected Final    Coronavirus HKU1 05/04/2025 Not Detected  Not Detected Final    Coronavirus NL63 05/04/2025 Not Detected  Not Detected Final    Coronavirus OC43  05/04/2025 Not Detected  Not Detected Final    COVID19 05/04/2025 Not Detected  Not Detected - Ref. Range Final    Human Metapneumovirus 05/04/2025 Not Detected  Not Detected Final    Human Rhinovirus/Enterovirus 05/04/2025 Not Detected  Not Detected Final    Influenza A PCR 05/04/2025 Not Detected  Not Detected Final    Influenza B PCR 05/04/2025 Not Detected  Not Detected Final    Parainfluenza Virus 1 05/04/2025 Not Detected  Not Detected Final    Parainfluenza Virus 2 05/04/2025 Not Detected  Not Detected Final    Parainfluenza Virus 3 05/04/2025 Not Detected  Not Detected Final    Parainfluenza Virus 4 05/04/2025 Not Detected  Not Detected Final    RSV, PCR 05/04/2025 Not Detected  Not Detected Final    Bordetella pertussis pcr 05/04/2025 Not Detected  Not Detected Final    Bordetella parapertussis PCR 05/04/2025 Not Detected  Not Detected Final    Chlamydophila pneumoniae PCR 05/04/2025 Not Detected  Not Detected Final    Mycoplasma pneumo by PCR 05/04/2025 Not Detected  Not Detected Final    Glucose 05/04/2025 156 (H)  65 - 99 mg/dL Final    BUN 05/04/2025 18  6 - 20 mg/dL Final    Creatinine 05/04/2025 0.92  0.76 - 1.27 mg/dL Final    Sodium 05/04/2025 145  136 - 145 mmol/L Final    Potassium 05/04/2025 4.1  3.5 - 5.2 mmol/L Final    Chloride 05/04/2025 107  98 - 107 mmol/L Final    CO2 05/04/2025 30.1 (H)  22.0 - 29.0 mmol/L Final    Calcium 05/04/2025 9.3  8.6 - 10.5 mg/dL Final    Total Protein 05/04/2025 6.8  6.0 - 8.5 g/dL Final    Albumin 05/04/2025 4.2  3.5 - 5.2 g/dL Final    ALT (SGPT) 05/04/2025 25  1 - 41 U/L Final    AST (SGOT) 05/04/2025 21  1 - 40 U/L Final    Alkaline Phosphatase 05/04/2025 66  39 - 117 U/L Final    Total Bilirubin 05/04/2025 0.5  0.0 - 1.2 mg/dL Final    Globulin 05/04/2025 2.6  gm/dL Final    A/G Ratio 05/04/2025 1.6  g/dL Final    BUN/Creatinine Ratio 05/04/2025 19.6  7.0 - 25.0 Final    Anion Gap 05/04/2025 7.9  5.0 - 15.0 mmol/L Final    eGFR 05/04/2025 100.7  >60.0  mL/min/1.73 Final    Protime 05/04/2025 12.9  11.6 - 15.1 Seconds Final    INR 05/04/2025 0.96  0.90 - 1.10 Final    PTT 05/04/2025 29.1  24.5 - 35.9 seconds Final    Lipase 05/04/2025 57  13 - 60 U/L Final    Color, UA 05/04/2025 Yellow  Yellow, Straw Final    Appearance, UA 05/04/2025 Clear  Clear Final    pH, UA 05/04/2025 6.5  5.0 - 8.0 Final    Specific Gravity, UA 05/04/2025 1.007  1.005 - 1.030 Final    Glucose, UA 05/04/2025 Negative  Negative Final    Ketones, UA 05/04/2025 Negative  Negative Final    Bilirubin, UA 05/04/2025 Negative  Negative Final    Blood, UA 05/04/2025 Negative  Negative Final    Protein, UA 05/04/2025 Negative  Negative Final    Leuk Esterase, UA 05/04/2025 Negative  Negative Final    Nitrite, UA 05/04/2025 Negative  Negative Final    Urobilinogen, UA 05/04/2025 0.2 E.U./dL  0.2 - 1.0 E.U./dL Final    proBNP 05/04/2025 248.5  0.0 - 900.0 pg/mL Final    HS Troponin T 05/04/2025 19  <22 ng/L Final    Blood Culture 05/04/2025 No growth at 5 days   Final    Blood Culture 05/04/2025 No growth at 5 days   Final    Lactate 05/04/2025 0.7  0.5 - 2.0 mmol/L Final    C-Reactive Protein 05/04/2025 <0.30  0.00 - 0.50 mg/dL Final    Magnesium 05/04/2025 1.8  1.6 - 2.6 mg/dL Final    TSH 05/04/2025 4.220 (H)  0.270 - 4.200 uIU/mL Final    Free T4 05/04/2025 1.13  0.92 - 1.68 ng/dL Final    QT Interval 05/04/2025 368  ms Final    QTC Interval 05/04/2025 434  ms Final    WBC 05/04/2025 6.09  3.40 - 10.80 10*3/mm3 Final    RBC 05/04/2025 4.65  4.14 - 5.80 10*6/mm3 Final    Hemoglobin 05/04/2025 13.8  13.0 - 17.7 g/dL Final    Hematocrit 05/04/2025 41.5  37.5 - 51.0 % Final    MCV 05/04/2025 89.2  79.0 - 97.0 fL Final    MCH 05/04/2025 29.7  26.6 - 33.0 pg Final    MCHC 05/04/2025 33.3  31.5 - 35.7 g/dL Final    RDW 05/04/2025 13.6  12.3 - 15.4 % Final    RDW-SD 05/04/2025 45.1  37.0 - 54.0 fl Final    MPV 05/04/2025 8.7  6.0 - 12.0 fL Final    Platelets 05/04/2025 208  140 - 450 10*3/mm3 Final     "Neutrophil % 05/04/2025 62.7  42.7 - 76.0 % Final    Lymphocyte % 05/04/2025 22.2  19.6 - 45.3 % Final    Monocyte % 05/04/2025 11.3  5.0 - 12.0 % Final    Eosinophil % 05/04/2025 2.6  0.3 - 6.2 % Final    Basophil % 05/04/2025 0.5  0.0 - 1.5 % Final    Immature Grans % 05/04/2025 0.7 (H)  0.0 - 0.5 % Final    Neutrophils, Absolute 05/04/2025 3.82  1.70 - 7.00 10*3/mm3 Final    Lymphocytes, Absolute 05/04/2025 1.35  0.70 - 3.10 10*3/mm3 Final    Monocytes, Absolute 05/04/2025 0.69  0.10 - 0.90 10*3/mm3 Final    Eosinophils, Absolute 05/04/2025 0.16  0.00 - 0.40 10*3/mm3 Final    Basophils, Absolute 05/04/2025 0.03  0.00 - 0.20 10*3/mm3 Final    Immature Grans, Absolute 05/04/2025 0.04  0.00 - 0.05 10*3/mm3 Final    nRBC 05/04/2025 0.0  0.0 - 0.2 /100 WBC Final    Extra Tube 05/04/2025 Hold for add-ons.   Final    Auto resulted.    Extra Tube 05/04/2025 hold for add-on   Final    Auto resulted    Extra Tube 05/04/2025 Hold for add-ons.   Final    Auto resulted.    Extra Tube 05/04/2025 Hold for add-ons.   Final    Auto resulted    Glucose 05/04/2025 126  70 - 130 mg/dL Final    HS Troponin T 05/04/2025 18  <22 ng/L Final    Troponin T Numeric Delta 05/04/2025 -1  Abnormal if >/=3 ng/L Final    D-Dimer, Quantitative 05/04/2025 0.42  0.00 - 0.51 MCGFEU/mL Final       Lab Results (last 72 hours)       ** No results found for the last 72 hours. **          WBC No results found for: \"WBC\"   HGB No results found for: \"HGB\"   HCT No results found for: \"HCT\"   Platlets No results found for: \"LABPLAT\"     PT/INR:  No results found for: \"PROTIME\"/No results found for: \"INR\"    Sodium No results found for: \"NA\"   Potassium No results found for: \"K\"   Chloride No results found for: \"CL\"   Bicarbonate No results found for: \"PLASMABICARB\"   BUN No results found for: \"BUN\"   Creatinine No results found for: \"CREATININE\"   Calcium No results found for: \"CALCIUM\"   Magnesium No results found for: \"MG\"     pH No results found for: " "\"PHART\"   pO2 No results found for: \"PO2ART\"   pCO2 No results found for: \"TUV2HMD\"   HCO3 No results found for: \"COT8SFO\"           Radiology Review:   Results for orders placed during the hospital encounter of 05/04/25    XR Chest 1 View    Narrative  Procedure: Frontal view of chest obtained.    Comparison: None available    History: sob,  peripheral edema, chest pain    Findings:    No pneumonia or acute process seen in the chest.  Normal heart size and mediastinal contours.  Trachea is in midline position.  No edema or effusion is seen.  There is no evidence of pneumothorax.    Impression  No evidence of acute disease in the chest.    This report was finalized on 5/4/2025 1:02 PM by Robles Hassan MD.     No results found for this or any previous visit.    No results found for this or any previous visit.    No results found for this or any previous visit.    No results found for this or any previous visit.    No results found for this or any previous visit.     No results found for this or any previous visit.    No results found for this or any previous visit.    No results found for this or any previous visit.                  Results Review: I reviewed the patient's new clinical results.    Assessment and Plan    Visit Diagnosis:     ICD-10-CM ICD-9-CM   1. 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 EVELINE (obstructive sleep apnea)  G47.33 327.23   Patient was treated for sarcoidosis several years ago.  However, CT scan which was done in May did not show any abnormality suggestive of sarcoidosis.    Chronic cough could be due to bronchitis/cough variant asthma.    I also suspect that he still has obstructive sleep apnea.    I gave him a prescription for albuterol inhaler 2 puffs every 4 hours as needed.    Will proceed with spirometry and home sleep study.    Return to clinic after above workup.        New Medications:   New Medications Ordered This Visit   Medications    " albuterol sulfate HFA (Ventolin HFA) 108 (90 Base) MCG/ACT inhaler     Sig: Inhale 2 puffs Every 4 (Four) Hours As Needed for Wheezing.     Dispense:  1 g     Refill:  5       Discontinued Medications:   There are no discontinued medications.         Follow Up:       Patient was given instructions and counseling regarding his condition. Please see specific information pulled into the AVS if appropriate.       This document has been electronically signed by Patrice Parks MD   July 8, 2025 14:39 EDT    Dictated Utilizing Dragon Dictation: Part of this note may be an electronic transcription/translation of spoken language to printed text using the Dragon Dictation System.

## 2025-07-15 ENCOUNTER — HOSPITAL ENCOUNTER (OUTPATIENT)
Dept: PULMONOLOGY | Facility: HOSPITAL | Age: 50
Discharge: HOME | End: 2025-07-15
Admitting: INTERNAL MEDICINE
Payer: COMMERCIAL

## 2025-07-15 DIAGNOSIS — R05.3 CHRONIC COUGH: ICD-10-CM

## 2025-07-15 PROCEDURE — 94010 BREATHING CAPACITY TEST: CPT

## 2025-08-04 ENCOUNTER — RESULTS FOLLOW-UP (OUTPATIENT)
Dept: PULMONOLOGY | Facility: CLINIC | Age: 50
End: 2025-08-04
Payer: COMMERCIAL

## 2025-08-04 DIAGNOSIS — G47.33 OSA (OBSTRUCTIVE SLEEP APNEA): Primary | ICD-10-CM
